# Patient Record
Sex: FEMALE | Race: WHITE | NOT HISPANIC OR LATINO | Employment: UNEMPLOYED | URBAN - METROPOLITAN AREA
[De-identification: names, ages, dates, MRNs, and addresses within clinical notes are randomized per-mention and may not be internally consistent; named-entity substitution may affect disease eponyms.]

---

## 2019-09-17 ENCOUNTER — HOSPITAL ENCOUNTER (OUTPATIENT)
Dept: URGENT CARE | Facility: CLINIC | Age: 39
Discharge: HOME | End: 2019-09-17
Attending: NURSE PRACTITIONER

## 2020-01-22 ENCOUNTER — HOSPITAL ENCOUNTER (OUTPATIENT)
Dept: URGENT CARE | Facility: CLINIC | Age: 40
Discharge: HOME | End: 2020-01-22
Attending: FAMILY MEDICINE

## 2020-05-12 ENCOUNTER — HOSPITAL ENCOUNTER (OUTPATIENT)
Dept: URGENT CARE | Facility: CLINIC | Age: 40
Discharge: HOME | End: 2020-05-12

## 2020-06-08 ENCOUNTER — OFFICE VISIT CONVERTED (OUTPATIENT)
Dept: NEUROLOGY | Facility: CLINIC | Age: 40
End: 2020-06-08
Attending: NURSE PRACTITIONER

## 2020-06-22 ENCOUNTER — HOSPITAL ENCOUNTER (OUTPATIENT)
Dept: CT IMAGING | Facility: HOSPITAL | Age: 40
Discharge: HOME | End: 2020-06-22
Attending: NURSE PRACTITIONER

## 2020-07-08 ENCOUNTER — OFFICE VISIT CONVERTED (OUTPATIENT)
Dept: NEUROLOGY | Facility: CLINIC | Age: 40
End: 2020-07-08
Attending: NURSE PRACTITIONER

## 2020-09-09 ENCOUNTER — OFFICE VISIT CONVERTED (OUTPATIENT)
Dept: NEUROLOGY | Facility: CLINIC | Age: 40
End: 2020-09-09
Attending: NURSE PRACTITIONER

## 2020-09-28 ENCOUNTER — HOSPITAL ENCOUNTER (OUTPATIENT)
Dept: URGENT CARE | Facility: CLINIC | Age: 40
Discharge: HOME | End: 2020-09-28
Attending: NURSE PRACTITIONER

## 2021-05-10 NOTE — H&P
History and Physical      Patient Name: Dee Bar   Patient ID: [id number]   Sex: Female   YOB: 1980    Referring Provider: Jannie MUSA    Visit Date: June 8, 2020    Provider: DIMPLE Raya   Location: [facility]   Location Address: [address] [place] [address]  [id number]   Location Phone: [phone]          Chief Complaint  · Migraines      History Of Present Illness  Dee Bar is a 39 year old female who presents today to [facility] today referred from Jannie MUSA for evaluation of headaches.   She reports that she developed headaches many years ago. Since that time, her headaches have progressively worsened.   Currently, she reports headaches that are located temporal and right sided. She characterizes the headaches as 10/10 in severity, sharp, throbbing, and in nature with associated photophobia, phonophobia, and nausea. Her headaches last 8-10 hours. She reports 16 headache days per month. She denies associated aura. She denies focal numbness, weakness, speech, and vision changes.   Triggers: Denies any known triggers   Symptoms improved by: Dark quiet room and Sleep   She states she is sleeping poorly. Reports getting 5-6 hours of sleep per night. Denies snoring. Reports unrefreshing sleep.   Prior prophylactic medications include: none   She uses abortive therapy such as: Tylenol, Toradol   Caffeine Use: 3 servings   Previous brain imaging: CT Head in ER-- 1. Subtle low-density focus in the right internal capsule, nonspecific in appearance. Chronic ischemic change would be in the differential. 2. No acute finding   She endorses a family history of cerebral aneurysm. Father passed away from brain aneurysm 4 years ago. Sister had 2 aneurysms found on scan after that time that were clipped.       Past Medical History  Hyperlipidemia; Migraines         Past Surgical History  C section; Cholecystectomy;  Hysterectomy; Partial Hysterectomy       Allergies Reconciled  Family Medical History  Depression; Stroke; Heart Disease; Hypertension; Diabetes; Family history of stroke         Social History  Alcohol (Current some day); lives alone; Single; Tobacco (Current every day); Working         Review of Systems  · Constitutional  o Admits  o : excessive sweating, fatigue  o Denies  o : chills, fever, sycope/passing out, weight gain, weight loss  · Eyes  o Admits  o : changes in vision, blurry vision, double vision  · HENT  o Admits  o : headaches  o Denies  o : loss of hearing, ringing in the ears, ear aches, sore throat, nasal congestion, sinus pain, nose bleeds, seasonal allergies  · Cardiovascular  o Denies  o : blood clots, swollen legs, anemia, easy burising or bleeding, transfusions  · Respiratory  o Denies  o : shortness of breath, dry cough, productive cough, pneumonia, COPD  · Gastrointestinal  o Denies  o : difficulty swallowing, reflux  · Genitourinary  o Denies  o : incontinence  · Neurologic  o Admits  o : headache, dizziness/vertigo, difficulty with sleep  o Denies  o : seizure, stroke, tremor, loss of balance, falls, numbness/tingling/paresthesia , difficulty with coordination, difficulty with dexterity, weakness  · Musculoskeletal  o Denies  o : neck stiffness/pain, swollen lymph nodes, muscle aches, joint pain, weakness, spasms, sciatica, pain radiating in arm, pain radiating in leg, low back pain  · Endocrine  o Denies  o : diabetes, thyroid disorder  · Psychiatric  o Admits  o : anxiety  o Denies  o : depression      Vitals  Date Time BP Position Site L\R Cuff Size HR RR TEMP (F) WT  HT  BMI kg/m2 BSA m2 O2 Sat        06/08/2020 07:50 /89 Sitting    79 - R  98.2 142lbs 8oz 5'   27.83 1.65           Physical Examination  · Constitutional  o Appearance  o : well-nourished, well groomed, in no apparent distress  · Eyes  o Pupils and Irises  o : Pupils equal, round, and reactive to light and  accommodation bilaterally  · Respiratory  o Auscultation of Lungs  o : Lungs were clear to ascultation bilaterally. No wheezes, rhonchi or rales were appreciated.  · Cardiovascular  o Heart  o :   § Auscultation of Heart  § : Regular rate and rhythm, no murmurs, gallops or rubs were appreciated.  o Peripheral Vascular System  o :   § Extremities  § : No peripheral edema was appreciated  · Musculoskeletal  o General  o : Normal bulk and normal tone throughout. 5/5 motor strength throughout and symmetric. Normal gait and station.  · Neurologic  o Mental Status Examination  o :   § Orientation  § : Alert and oriented to person, place, and time,  § Speech/Language  § : Intact naming, comprehension, and repetition. No dysarthria.  § Memory  § : Immediate recall is 3/3. Recall at 5 minutes is 3/3.   § Attention  § : Attention span is intact to serial 7 examination and finger tapping.   § Fund of Knowledge  § : Adequate fund of knowledge  o Cranial Nerves  o : Pupils are equal, round and reactive to light. Extraocular movements are intact. Visual fields are full. Fundoscopic examination reveals sharp disc bilaterally. Sensation in the V1-V3 distribution is intact and symmetric. Muscles of mastication are strong and symmetric. Muscles of facial expression are strong and symmetric. Hearing is intact. Palatal raise is intact and symmetric. Uvula is midline. Shoulder shrug is strong. Tongue protrudes in the midline.  o Motor Examination  o :   § RUE Strength  § : strength normal  § LUE Strength  § : strength normal  § RLE Strength  § : strength normal  § LLE Strength  § : strength normal  o Reflexes  o : 2+ reflexes throughout and symmetric. Negative Ortega. Negative Babinski.   o Sensation  o : Intact sensation to light touch, pinprick, vibration and proprioception throughout.  o Gait and Station  o :   § Gait Screening  § : Normal, narrow based gait, with a normal arm swing.  o Coordination  o : Intact finger to nose and  heel to shin. Rapid alternating movements are intact in the upper and lower extremities.          Assessment  · Intractable migraine without aura and without status migrainosus     346.11/G43.019  Will start amitriptyline for preventative therapy of migraine. Will consider triptan for abortive therapy after MRI and CTA obtained.  · Family history of aneurysm of blood vessel of brain     V17.49/Z82.49  Will order CTA for further evaluation due to first degree family history of brain aneurysm rupture.   · Abnormal CT scan, head     793.0/R93.0  Will order MRI brain to further evaluate abnormal findings in the right internal capsule found on CT head.     Problems Reconciled  Plan  · Orders  o CTA Head with IV Contrast [facility]; no Oral Prep (39655) - - 06/08/2020  o MRI brain wo then w contrast (03628) - 793.0/R93.0, 346.11/G43.019 - 06/08/2020  · Medications  o amitriptyline 25 mg oral tablet   SIG: take 1 tablet (25 mg) by oral route once daily at bedtime for 30 days   DISP: (30) tablets with 3 refills  Prescribed on 06/08/2020     o Medications have been Reconciled  o Transition of Care or Provider Policy  · Instructions  o Encouraged to follow-up with Primary Care Provider for preventative care.  o Call or Return if symptoms worsen or persist.  o Follow up in 1 month.             Electronically Signed by: DIMPLE Raya -Author on June 8, 2020 08:49:39 AM

## 2021-05-13 NOTE — PROGRESS NOTES
Progress Note      Patient Name: Dee Bar   Patient ID: [id number]   Sex: Female   YOB: 1980    Referring Provider: Jannie MUSA    Visit Date: September 9, 2020    Provider: DIMPLE Raya   Location: [facility]   Location Address: [address] [place] [address]  [id number]   Location Phone: [phone]          Chief Complaint     follow up       History Of Present Illness  Dee Bar is a 39 year old female who presents today to [facility] today referred from Jannie MUSA for evaluation of headaches.   She reports that she developed headaches many years ago. Since that time, her headaches have progressively worsened.   Currently, she reports headaches that are located temporal and right sided. She characterizes the headaches as 10/10 in severity, sharp, throbbing, and in nature with associated photophobia, phonophobia, and nausea. Her headaches last 8-10 hours. She reports 16 headache days per month. She denies associated aura. She denies focal numbness, weakness, speech, and vision changes.   Triggers: Denies any known triggers   Symptoms improved by: Dark quiet room and Sleep   She states she is sleeping poorly. Reports getting 5-6 hours of sleep per night. Denies snoring. Reports unrefreshing sleep.   Prior prophylactic medications include: amitriptyline, topiramate, unable to take BB due to hypotension.   She uses abortive therapy such as: Tylenol, Excedrin, Toradol, not triptan candidate due to chronic infarct right internal capsule, Naproxen, Ibuprofen   Caffeine Use: 3 servings   Previous brain imaging: CT Head in ER-- 1. Subtle low-density focus in the right internal capsule, nonspecific in appearance. Chronic ischemic change would be in the differential.2. No acute finding   She endorses a family history of cerebral aneurysm. Father passed away from brain aneurysm 4 years ago. Sister had 2  aneurysms found on scan after that time that were clipped.   Dee Bar is a 39 year old female who presents today to [facility] today referred from Jannie MUSA for follow up. Continuing to have 16 headache days per month. Failed topiramate due to side effect.   Previous brain imaging: MRI brain, CTA head      Childbearing potential: hysterectomy       Past Medical History  Hyperlipidemia; Migraines         Past Surgical History  C section; Cholecystectomy; Hysterectomy; Partial Hysterectomy         Medication List  Aimovig Autoinjector 140 mg/mL subcutaneous auto-injector; amitriptyline 25 mg oral tablet; buspirone 10 mg oral tablet; nurtec; Nurtec ODT 75 mg oral tablet,disintegrating; Prozac 20 mg oral capsule; trazodone 50 mg oral tablet; Tylenol 325 mg oral capsule         Allergy List  NO KNOWN DRUG ALLERGIES       Allergies Reconciled  Family Medical History  Depression; Stroke; Heart Disease; Hypertension; Diabetes; Family history of stroke         Social History  Alcohol (Current some day); lives alone; Single; Tobacco (Current every day); Working         Review of Systems  · Constitutional  o Admits  o : excessive sweating, fatigue  o Denies  o : chills, fever, sycope/passing out, weight gain, weight loss  · Eyes  o Denies  o : changes in vision, blurry vision, double vision  · HENT  o Denies  o : loss of hearing, ringing in the ears, ear aches, sore throat, nasal congestion, sinus pain, nose bleeds, seasonal allergies  · Cardiovascular  o Denies  o : blood clots, swollen legs, anemia, easy burising or bleeding, transfusions  · Respiratory  o Denies  o : shortness of breath, dry cough, productive cough, pneumonia, COPD  · Gastrointestinal  o Denies  o : difficulty swallowing, reflux  · Genitourinary  o Denies  o : incontinence  · Neurologic  o Admits  o : headache, stroke, difficulty with sleep  o Denies  o : seizure, tremor, loss of balance, falls, dizziness/vertigo,  numbness/tingling/paresthesia , difficulty with coordination, difficulty with dexterity, weakness  · Musculoskeletal  o Admits  o : low back pain  o Denies  o : neck stiffness/pain, swollen lymph nodes, muscle aches, joint pain, weakness, spasms, sciatica, pain radiating in arm, pain radiating in leg  · Endocrine  o Denies  o : diabetes, thyroid disorder  · Psychiatric  o Admits  o : anxiety, depression      Vitals  Date Time BP Position Site L\R Cuff Size HR RR TEMP (F) WT  HT  BMI kg/m2 BSA m2 O2 Sat HC       09/09/2020 09:46 /74 Sitting    79 - R  97.5 142lbs 7oz 5'   27.82 1.65           Physical Examination  · Constitutional  o Appearance  o : well-nourished, well groomed, in no apparent distress  · Eyes  o Pupils and Irises  o : Pupils equal, round, and reactive to light and accommodation bilaterally  · Respiratory  o Auscultation of Lungs  o : Lungs were clear to ascultation bilaterally. No wheezes, rhonchi or rales were appreciated.  · Cardiovascular  o Heart  o :   § Auscultation of Heart  § : Regular rate and rhythm, no murmurs, gallops or rubs were appreciated.  o Peripheral Vascular System  o :   § Extremities  § : No peripheral edema was appreciated  · Musculoskeletal  o General  o : Normal bulk and normal tone throughout. 5/5 motor strength throughout and symmetric. Normal gait and station.  · Neurologic  o Mental Status Examination  o :   § Orientation  § : Alert and oriented to person, place, and time,  § Speech/Language  § : Intact naming, comprehension, and repetition. No dysarthria.  § Memory  § : Immediate recall is 3/3. Recall at 5 minutes is 3/3.   § Attention  § : Attention span is intact to serial 7 examination and finger tapping.   § Fund of Knowledge  § : Adequate fund of knowledge  o Cranial Nerves  o : Pupils are equal, round and reactive to light. Extraocular movements are intact. Visual fields are full. Fundoscopic examination reveals sharp disc bilaterally. Sensation in the V1-V3  distribution is intact and symmetric. Muscles of mastication are strong and symmetric. Muscles of facial expression are strong and symmetric. Hearing is intact. Palatal raise is intact and symmetric. Uvula is midline. Shoulder shrug is strong. Tongue protrudes in the midline.  o Motor Examination  o :   § RUE Strength  § : strength normal  § LUE Strength  § : strength normal  § RLE Strength  § : strength normal  § LLE Strength  § : strength normal  o Reflexes  o : 2+ reflexes throughout and symmetric. Negative Ortega. Negative Babinski.   o Sensation  o : Intact sensation to light touch, pinprick, vibration and proprioception throughout.  o Gait and Station  o :   § Gait Screening  § : Normal, narrow based gait, with a normal arm swing.  o Coordination  o : Intact finger to nose and heel to shin. Rapid alternating movements are intact in the upper and lower extremities.          Assessment  · Intractable migraine without aura and without status migrainosus     346.11/G43.019  Will start Aimovig for preventative therapy of migraine, Is not triptan candidate due to small lacunar infarct in internal capsule on CTA. Continue Nurtec PRN for abortive therapy.   · Family history of aneurysm of blood vessel of brain     V17.49/Z82.49  Discussed CTA results. CTA negative for aneurysm.     Problems Reconciled  Plan  · Orders  o IM/SQ - Injection Fee [facility] (04626) - 346.11/G43.019 - 09/09/2020   Sample of 140mg Aimovig given SQ to right abdomen by HANS Arauz RN. Patient tolerated well. Lot [id number] Exp: 06/21  · Medications  o Aimovig Autoinjector 140 mg/mL subcutaneous auto-injector   SIG: inject 1 milliliter (140 mg) by subcutaneous route once a month in the abdomen, thigh, or outer area of upper arm for 30 days   DISP: (1) Auto-injector with 3 refills  Adjusted on 09/11/2020     o Nurtec ODT 75 mg oral tablet,disintegrating   SIG: Dissolve 1 tablet on top of tongue then swallow. Max 1 tablet/day   DISP: (8) tablets with 3  refills  Refilled on 09/11/2020     o Medications have been Reconciled  o Transition of Care or Provider Policy            Electronically Signed by: DIMPLE Raya -Author on September 11, 2020 01:30:21 PM

## 2021-05-13 NOTE — PROGRESS NOTES
Progress Note      Patient Name: Dee Bar   Patient ID: [id number]   Sex: Female   YOB: 1980    Referring Provider: Jannie MUSA    Visit Date: July 8, 2020    Provider: DIMPLE Raya   Location: [facility]   Location Address: [address] [place] [address]  [id number]   Location Phone: [phone]          Chief Complaint  · Migraines      History Of Present Illness  Dee Bar is a 39 year old female who presents today to [facility] today referred from Jannie MUSA for evaluation of headaches.   She reports that she developed headaches many years ago. Since that time, her headaches have progressively worsened.   Currently, she reports headaches that are located temporal and right sided. She characterizes the headaches as 10/10 in severity, sharp, throbbing, and in nature with associated photophobia, phonophobia, and nausea. Her headaches last 8-10 hours. She reports 16 headache days per month. She denies associated aura. She denies focal numbness, weakness, speech, and vision changes.   Triggers: Denies any known triggers   Symptoms improved by: Dark quiet room and Sleep   She states she is sleeping poorly. Reports getting 5-6 hours of sleep per night. Denies snoring. Reports unrefreshing sleep.   Prior prophylactic medications include: amitriptyline,   She uses abortive therapy such as: Tylenol, Excedrin, Toradol, not triptan candidate due to chronic infarct right internal capsule, Naproxen, Ibuprofen   Caffeine Use: 3 servings   Previous brain imaging: CT Head in ER-- 1. Subtle low-density focus in the right internal capsule, nonspecific in appearance. Chronic ischemic change would be in the differential.2. No acute finding   She endorses a family history of cerebral aneurysm. Father passed away from brain aneurysm 4 years ago. Sister had 2 aneurysms found on scan after that time that were clipped.   Dee CARRANZA  Yosvany is a 39 year old female who presents today to [facility] today referred from Jannie MUSA for follow up. Continuing to have 16 headache days per month. Did not tolerate amitriptyline due to side effect. States she's been under a lot of stress.   Previous brain imaging: MRI brain, CTA head       Past Medical History  Hyperlipidemia; Migraines         Past Surgical History  C section; Cholecystectomy; Hysterectomy; Partial Hysterectomy         Medication List  amitriptyline 25 mg oral tablet; buspirone 10 mg oral tablet; nurtec; Tylenol 325 mg oral capsule         Allergy List  NO KNOWN DRUG ALLERGIES       Allergies Reconciled  Family Medical History  Depression; Stroke; Heart Disease; Hypertension; Diabetes; Family history of stroke         Social History  Alcohol (Current some day); lives alone; Single; Tobacco (Current every day); Working         Review of Systems  · Constitutional  o Admits  o : fatigue, weight loss  o Denies  o : chills, excessive sweating, fever, sycope/passing out, weight gain  · Eyes  o Denies  o : changes in vision, blurry vision, double vision  · HENT  o Denies  o : loss of hearing, ringing in the ears, ear aches, sore throat, nasal congestion, sinus pain, nose bleeds, seasonal allergies  · Cardiovascular  o Denies  o : blood clots, swollen legs, anemia, easy burising or bleeding, transfusions  · Respiratory  o Denies  o : shortness of breath, dry cough, productive cough, pneumonia, COPD  · Gastrointestinal  o Denies  o : difficulty swallowing, reflux  · Genitourinary  o Denies  o : incontinence  · Neurologic  o Admits  o : headache, difficulty with sleep  o Denies  o : seizure, stroke, tremor, loss of balance, falls, dizziness/vertigo, numbness/tingling/paresthesia , difficulty with coordination, difficulty with dexterity, weakness  · Musculoskeletal  o Admits  o : low back pain  o Denies  o : neck stiffness/pain, swollen lymph nodes, muscle aches, joint pain,  weakness, spasms, sciatica, pain radiating in arm, pain radiating in leg  · Endocrine  o Denies  o : diabetes, thyroid disorder  · Psychiatric  o Admits  o : anxiety  o Denies  o : depression      Vitals  Date Time BP Position Site L\R Cuff Size HR RR TEMP (F) WT  HT  BMI kg/m2 BSA m2 O2 Sat        07/08/2020 08:41 /75 Sitting    83 - R  97.8 143lbs 6oz              Physical Examination  · Constitutional  o Appearance  o : well-nourished, well groomed, in no apparent distress  · Eyes  o Pupils and Irises  o : Pupils equal, round, and reactive to light and accommodation bilaterally  · Respiratory  o Auscultation of Lungs  o : Lungs were clear to ascultation bilaterally. No wheezes, rhonchi or rales were appreciated.  · Cardiovascular  o Heart  o :   § Auscultation of Heart  § : Regular rate and rhythm, no murmurs, gallops or rubs were appreciated.  o Peripheral Vascular System  o :   § Extremities  § : No peripheral edema was appreciated  · Musculoskeletal  o General  o : Normal bulk and normal tone throughout. 5/5 motor strength throughout and symmetric. Normal gait and station.  · Neurologic  o Mental Status Examination  o :   § Orientation  § : Alert and oriented to person, place, and time,  § Speech/Language  § : Intact naming, comprehension, and repetition. No dysarthria.  § Memory  § : Immediate recall is 3/3. Recall at 5 minutes is 3/3.   § Attention  § : Attention span is intact to serial 7 examination and finger tapping.   § Fund of Knowledge  § : Adequate fund of knowledge  o Cranial Nerves  o : Pupils are equal, round and reactive to light. Extraocular movements are intact. Visual fields are full. Fundoscopic examination reveals sharp disc bilaterally. Sensation in the V1-V3 distribution is intact and symmetric. Muscles of mastication are strong and symmetric. Muscles of facial expression are strong and symmetric. Hearing is intact. Palatal raise is intact and symmetric. Uvula is midline. Shoulder  shrug is strong. Tongue protrudes in the midline.  o Motor Examination  o :   § RUE Strength  § : strength normal  § LUE Strength  § : strength normal  § RLE Strength  § : strength normal  § LLE Strength  § : strength normal  o Reflexes  o : 2+ reflexes throughout and symmetric. Negative Ortega. Negative Babinski.   o Sensation  o : Intact sensation to light touch, pinprick, vibration and proprioception throughout.  o Gait and Station  o :   § Gait Screening  § : Normal, narrow based gait, with a normal arm swing.  o Coordination  o : Intact finger to nose and heel to shin. Rapid alternating movements are intact in the upper and lower extremities.          Assessment  · Intractable migraine without aura and without status migrainosus     346.11/G43.019  Discussed MRI results. Read normal. Will start topiramate for preventative therapy of migraine. Nurtec PRN for abortive therapy. Is not triptan candidate due to small lacunar infarct in internal capsule on CTA.   · Family history of aneurysm of blood vessel of brain     V17.49/Z82.49  Discussed CTA results. CTA negative for aneurysm.     Problems Reconciled  Plan  · Orders  o ACO-17: Screened for tobacco use AND received tobacco cessation intervention (4004F) - - 07/08/2020  · Medications  o Nurtec ODT 75 mg oral tablet,disintegrating   SIG: Dissolve 1 tablet on top of tongue then swallow. Max 1 tablet/day   DISP: (8) tablets with 3 refills  Prescribed on 07/08/2020     o topiramate 50 mg oral tablet   SIG: take 1 tablet by oral route 2 times a day for 30 days one tablet qHS for 2 weeks, then BID   DISP: (60) tablets with 5 refills  Prescribed on 07/08/2020     o Medications have been Reconciled  o Transition of Care or Provider Policy  · Instructions  o Call or Return if symptoms persist.  o Handouts provided regarding new medications listed above.  o Follow Up in 2 months.   o Electronically Identified Patient Education Materials Provided  Electronically  · Disposition  o Call or Return if symptoms worsen or persist.            Electronically Signed by: DIMPLE Raya -Author on July 8, 2020 01:24:06 PM

## 2021-05-14 VITALS
BODY MASS INDEX: 27.96 KG/M2 | DIASTOLIC BLOOD PRESSURE: 74 MMHG | TEMPERATURE: 97.5 F | HEART RATE: 79 BPM | SYSTOLIC BLOOD PRESSURE: 115 MMHG | WEIGHT: 142.44 LBS | HEIGHT: 60 IN

## 2021-05-15 VITALS
WEIGHT: 142.5 LBS | SYSTOLIC BLOOD PRESSURE: 126 MMHG | HEART RATE: 79 BPM | DIASTOLIC BLOOD PRESSURE: 89 MMHG | HEIGHT: 60 IN | TEMPERATURE: 98.2 F | BODY MASS INDEX: 27.98 KG/M2

## 2021-05-15 VITALS
HEART RATE: 83 BPM | WEIGHT: 143.37 LBS | SYSTOLIC BLOOD PRESSURE: 148 MMHG | TEMPERATURE: 97.8 F | DIASTOLIC BLOOD PRESSURE: 75 MMHG

## 2021-08-11 ENCOUNTER — HOSPITAL ENCOUNTER (EMERGENCY)
Facility: HOSPITAL | Age: 41
Discharge: HOME | End: 2021-08-11
Attending: EMERGENCY MEDICINE | Admitting: EMERGENCY MEDICINE

## 2021-08-11 ENCOUNTER — APPOINTMENT (OUTPATIENT)
Dept: GENERAL RADIOLOGY | Facility: HOSPITAL | Age: 41
End: 2021-08-11

## 2021-08-11 VITALS
HEIGHT: 62 IN | OXYGEN SATURATION: 100 % | DIASTOLIC BLOOD PRESSURE: 84 MMHG | HEART RATE: 98 BPM | SYSTOLIC BLOOD PRESSURE: 107 MMHG | RESPIRATION RATE: 18 BRPM | WEIGHT: 135.36 LBS | TEMPERATURE: 99 F | BODY MASS INDEX: 24.91 KG/M2

## 2021-08-11 DIAGNOSIS — M25.512 ACUTE PAIN OF LEFT SHOULDER: ICD-10-CM

## 2021-08-11 DIAGNOSIS — M25.552 LEFT HIP PAIN: ICD-10-CM

## 2021-08-11 DIAGNOSIS — M16.12 ARTHRITIS OF LEFT HIP: Primary | ICD-10-CM

## 2021-08-11 PROCEDURE — 96372 THER/PROPH/DIAG INJ SC/IM: CPT

## 2021-08-11 PROCEDURE — 73502 X-RAY EXAM HIP UNI 2-3 VIEWS: CPT

## 2021-08-11 PROCEDURE — 73030 X-RAY EXAM OF SHOULDER: CPT

## 2021-08-11 PROCEDURE — 25010000002 KETOROLAC TROMETHAMINE PER 15 MG

## 2021-08-11 PROCEDURE — 25010000002 DEXAMETHASONE PER 1 MG

## 2021-08-11 PROCEDURE — 99282 EMERGENCY DEPT VISIT SF MDM: CPT

## 2021-08-11 RX ORDER — KETOROLAC TROMETHAMINE 30 MG/ML
30 INJECTION, SOLUTION INTRAMUSCULAR; INTRAVENOUS ONCE
Status: COMPLETED | OUTPATIENT
Start: 2021-08-11 | End: 2021-08-11

## 2021-08-11 RX ORDER — DEXAMETHASONE SODIUM PHOSPHATE 10 MG/ML
6 INJECTION INTRAMUSCULAR; INTRAVENOUS ONCE
Status: COMPLETED | OUTPATIENT
Start: 2021-08-11 | End: 2021-08-11

## 2021-08-11 RX ORDER — KETOROLAC TROMETHAMINE 10 MG/1
10 TABLET, FILM COATED ORAL EVERY 6 HOURS PRN
Qty: 20 TABLET | Refills: 0 | OUTPATIENT
Start: 2021-08-11 | End: 2022-01-04

## 2021-08-11 RX ADMIN — KETOROLAC TROMETHAMINE 30 MG: 30 INJECTION, SOLUTION INTRAMUSCULAR; INTRAVENOUS at 22:41

## 2021-08-11 RX ADMIN — DEXAMETHASONE SODIUM PHOSPHATE 6 MG: 10 INJECTION INTRAMUSCULAR; INTRAVENOUS at 22:40

## 2021-08-12 NOTE — ED PROVIDER NOTES
Subjective    Patient is here today for left hip pain and left shoulder pain. Patient has known arthritis in lower back area and states she thinks she might have arthritis in her left hip.      History provided by:  Patient   used: No    Hip Pain  Location:  Left hip  Severity:  Moderate  Onset quality:  Gradual  Duration:  3 days  Timing:  Constant  Chronicity: Acute on chronic.  Associated symptoms: no abdominal pain, no chest pain, no congestion, no cough, no diarrhea, no ear pain, no fever, no headaches, no nausea, no shortness of breath, no sore throat and no vomiting        Review of Systems   Constitutional: Negative for chills and fever.   HENT: Negative for congestion, ear pain and sore throat.    Eyes: Negative for pain.   Respiratory: Negative for cough, chest tightness and shortness of breath.    Cardiovascular: Negative for chest pain.   Gastrointestinal: Negative for abdominal pain, diarrhea, nausea and vomiting.   Genitourinary: Negative for flank pain and hematuria.   Musculoskeletal: Positive for arthralgias. Negative for joint swelling.        Reports pain with ambulation to left hip as well as pain to left shoulder with movement.   Skin: Negative for pallor.   Neurological: Negative for seizures and headaches.   All other systems reviewed and are negative.      Past Medical History:   Diagnosis Date   • Arthritis    • Asthma        No Known Allergies    History reviewed. No pertinent surgical history.    History reviewed. No pertinent family history.    Social History     Socioeconomic History   • Marital status:      Spouse name: Not on file   • Number of children: Not on file   • Years of education: Not on file   • Highest education level: Not on file   Tobacco Use   • Smoking status: Current Every Day Smoker     Packs/day: 1.00     Types: Cigarettes   • Smokeless tobacco: Never Used   Vaping Use   • Vaping Use: Never used   Substance and Sexual Activity   • Alcohol  use: Yes     Comment: social   • Drug use: Defer   • Sexual activity: Defer           Objective   Physical Exam  Vitals and nursing note reviewed.   Constitutional:       General: She is not in acute distress.     Appearance: Normal appearance. She is not toxic-appearing.   HENT:      Head: Normocephalic and atraumatic.      Mouth/Throat:      Mouth: Mucous membranes are moist.   Eyes:      Extraocular Movements: Extraocular movements intact.      Pupils: Pupils are equal, round, and reactive to light.   Cardiovascular:      Rate and Rhythm: Normal rate and regular rhythm.      Pulses: Normal pulses.      Heart sounds: Normal heart sounds.   Pulmonary:      Effort: Pulmonary effort is normal. No respiratory distress.      Breath sounds: Normal breath sounds.   Abdominal:      General: Abdomen is flat.      Palpations: Abdomen is soft.      Tenderness: There is no abdominal tenderness.   Musculoskeletal:         General: Tenderness present. No deformity.      Cervical back: Normal range of motion and neck supple.      Comments: Patient has full range of motion. She is hesitant to perform full range of motion task due to pain however is able to do so.   Skin:     General: Skin is warm and dry.      Capillary Refill: Capillary refill takes 2 to 3 seconds.   Neurological:      General: No focal deficit present.      Mental Status: She is alert. Mental status is at baseline.         Procedures           ED Course                                           MDM  Number of Diagnoses or Management Options  Acute pain of left shoulder: minor  Arthritis of left hip: minor  Left hip pain: minor  Diagnosis management comments: I have spoke with the patient and I have explained the patient´s condition, diagnoses and treatment plan based on the information available to me at this time. I have answered all questions and addressed any concerns. The patient has a good understanding of the patient´s diagnosis, condition, and treatment  plan as can be expected at this point. The vital signs have been stable. The patient´s condition is stable and appropriate for discharge from the emergency department.      The patient will pursue further outpatient evaluation with the primary care physician or other designated or consulting physician as outlined in the discharge instructions. They are agreeable to this plan of care and follow-up instructions have been explained in detail. The patient has received these instructions in written format and have expressed an understanding of the discharge instructions. The patient is aware that any significant change in condition or worsening of symptoms should prompt an immediate return to this or the closest emergency department or call to 911.       Amount and/or Complexity of Data Reviewed  Tests in the radiology section of CPT®: reviewed and ordered    Risk of Complications, Morbidity, and/or Mortality  Diagnostic procedures: low  Management options: low    Patient Progress  Patient progress: stable      Final diagnoses:   Left hip pain   Acute pain of left shoulder   Arthritis of left hip       ED Disposition  ED Disposition     ED Disposition Condition Comment    Discharge Stable           Jannie Maldonado, APRN  [address] [id number] [phone]    In 3 days  Follow-up with your PCP for MRI follow-up for this ER visit.         Medication List      New Prescriptions    ketorolac 10 MG tablet  Commonly known as: TORADOL  Take 1 tablet by mouth Every 6 (Six) Hours As Needed for Moderate Pain .           Where to Get Your Medications      These medications were sent to [id number] - [place], [place] - [address] AT [facility] - [phone] [facility] [phone] FX  [address] [place] [address] [place] [address]    Hours: 24-hours Phone: [phone]   · ketorolac 10 MG tablet          Ai Martinez, APRN  08/12/21 2630

## 2022-03-09 ENCOUNTER — OFFICE VISIT (OUTPATIENT)
Dept: ORTHOPEDIC SURGERY | Facility: CLINIC | Age: 42
End: 2022-03-09

## 2022-03-09 VITALS — OXYGEN SATURATION: 98 % | WEIGHT: 137 LBS | BODY MASS INDEX: 26.9 KG/M2 | HEIGHT: 60 IN | HEART RATE: 71 BPM

## 2022-03-09 DIAGNOSIS — M25.552 LEFT HIP PAIN: Primary | ICD-10-CM

## 2022-03-09 PROCEDURE — 99203 OFFICE O/P NEW LOW 30 MIN: CPT | Performed by: ORTHOPAEDIC SURGERY

## 2022-03-09 RX ORDER — CHOLECALCIFEROL (VITAMIN D3) 125 MCG
CAPSULE ORAL
COMMUNITY
Start: 2022-03-04

## 2022-03-09 RX ORDER — DICLOFENAC SODIUM 75 MG/1
TABLET, DELAYED RELEASE ORAL
COMMUNITY
Start: 2022-02-25 | End: 2022-04-20 | Stop reason: SDUPTHER

## 2022-03-09 NOTE — PROGRESS NOTES
"Chief Complaint  Initial Evaluation of the Left Hip     Subjective      Dee Bar presents to [facility] for an evaluation of left hip. She has been having left hip pain for approximately 1 year. In the last 3-4 months the pain has worsened. She states pain on the lateral aspect of the hip. She denies groin pain.     No Known Allergies     Social History     Socioeconomic History   • Marital status:    Tobacco Use   • Smoking status: Current Every Day Smoker     Packs/day: 1.00     Types: Cigarettes   • Smokeless tobacco: Never Used   Vaping Use   • Vaping Use: Never used   Substance and Sexual Activity   • Alcohol use: Never     Comment: social   • Drug use: Defer   • Sexual activity: Defer        Review of Systems     Objective   Vital Signs:   Pulse 71   Ht 152.4 cm (60\")   Wt 62.1 kg (137 lb)   SpO2 98%   BMI 26.76 kg/m²       Physical Exam  Constitutional:       Appearance: Normal appearance. Patient is well-developed and normal weight.   HENT:      Head: Normocephalic.      Right Ear: Hearing and external ear normal.      Left Ear: Hearing and external ear normal.      Nose: Nose normal.   Eyes:      Conjunctiva/sclera: Conjunctivae normal.   Cardiovascular:      Rate and Rhythm: Normal rate.   Pulmonary:      Effort: Pulmonary effort is normal.      Breath sounds: No wheezing or rales.   Abdominal:      Palpations: Abdomen is soft.      Tenderness: There is no abdominal tenderness.   Musculoskeletal:      Cervical back: Normal range of motion.   Skin:     Findings: No rash.   Neurological:      Mental Status: Patient  is alert and oriented to person, place, and time.   Psychiatric:         Mood and Affect: Mood and affect normal.         Judgment: Judgment normal.       Ortho Exam      LEFT HIP: Good tone of hip flexors, hip extensors, hip adductor, hip abductors. Sensation grossly intact. Neurovascular intact.  Calf supple, non-tender, no signs of DVT. " Dorsal Pedal Pulse 2+, posterior tibialis pulse 2+. Lateral sided hip pain with internal rotation. Flexion to 130 degrees.       Procedures        Imaging Results (Most Recent)     None           Result Review :     PROCEDURE:  XR HIP W OR WO PELVIS 2-3 VIEW LEFT     COMPARISON: [facility], , LEFT HIP/PELVIS, 12/10/2018, 17:23.     INDICATIONS:  LEFT HIP PAIN NO KNOWN INJURY     FINDINGS:          There is a calcifications seen at the lateral-superior left acetabulum, as before.  Mineralization   appears within normal limits.  No definite displaced fracture or acute malalignment is seen.  Joint   spaces appear preserved.  Soft tissues appear grossly unremarkable.     CONCLUSION:   1. No definite radiographic findings of acute osseous hip or pelvic abnormality.  2. Chronic calcification at the superior-lateral left acetabulum which could be an accessory   ossicle or related to chronic labral tear.          Assessment and Plan     DX: Left hip pain     Discussed treatment plans and diagnosis with the patient. Discussed MRI with the patient. She agrees with this plan.     Call or return if worsening symptoms.    Follow Up     Follow-up after MRI.       Patient was given instructions and counseling regarding her condition or for health maintenance advice. Please see specific information pulled into the AVS if appropriate.     Scribed for Thomas Doll MD by Vanessa Matamoros.  03/09/22   10:28 EST    I have personally performed the services described in this document as scribed by the above individual and it is both accurate and complete. Thomas Doll MD 03/09/22

## 2022-03-21 ENCOUNTER — TELEPHONE (OUTPATIENT)
Dept: ORTHOPEDIC SURGERY | Facility: CLINIC | Age: 42
End: 2022-03-21

## 2022-03-21 NOTE — TELEPHONE ENCOUNTER
Caller: EFREN     Relationship: PCP OFFICE    Best call back number: [phone]    What form or medical record are you requesting: PROGRESS NOTES FROM 3/9/22    Who is requesting this form or medical record from you: PCP    How would you like to receive the form or medical records (pick-up, mail, fax): FAX  If fax, what is the fax number:       Timeframe paperwork needed: ASAP

## 2022-03-31 ENCOUNTER — TRANSCRIBE ORDERS (OUTPATIENT)
Dept: ADMINISTRATIVE | Facility: HOSPITAL | Age: 42
End: 2022-03-31

## 2022-03-31 DIAGNOSIS — G43.109 MIGRAINE AURA WITHOUT HEADACHE: Primary | ICD-10-CM

## 2022-04-01 ENCOUNTER — HOSPITAL ENCOUNTER (OUTPATIENT)
Dept: MRI IMAGING | Facility: HOSPITAL | Age: 42
Discharge: HOME | End: 2022-04-01
Admitting: ORTHOPAEDIC SURGERY

## 2022-04-01 DIAGNOSIS — M25.552 LEFT HIP PAIN: ICD-10-CM

## 2022-04-01 PROCEDURE — 73721 MRI JNT OF LWR EXTRE W/O DYE: CPT

## 2022-04-04 RX ORDER — RIMEGEPANT SULFATE 75 MG/75MG
TABLET, ORALLY DISINTEGRATING ORAL
Qty: 8 TABLET | OUTPATIENT
Start: 2022-04-04

## 2022-04-20 ENCOUNTER — OFFICE VISIT (OUTPATIENT)
Dept: ORTHOPEDIC SURGERY | Facility: CLINIC | Age: 42
End: 2022-04-20

## 2022-04-20 VITALS — HEART RATE: 96 BPM | HEIGHT: 60 IN | OXYGEN SATURATION: 98 % | BODY MASS INDEX: 26.74 KG/M2 | WEIGHT: 136.2 LBS

## 2022-04-20 DIAGNOSIS — S73.192A TEAR OF LEFT ACETABULAR LABRUM, INITIAL ENCOUNTER: Primary | ICD-10-CM

## 2022-04-20 PROCEDURE — 99214 OFFICE O/P EST MOD 30 MIN: CPT | Performed by: PHYSICIAN ASSISTANT

## 2022-04-20 RX ORDER — METHYLPREDNISOLONE 4 MG/1
TABLET ORAL
Qty: 21 TABLET | Refills: 0 | Status: SHIPPED | OUTPATIENT
Start: 2022-04-20

## 2022-04-20 RX ORDER — TOPIRAMATE 50 MG/1
50 TABLET, FILM COATED ORAL 2 TIMES DAILY
COMMUNITY
Start: 2022-04-03

## 2022-04-20 RX ORDER — DICLOFENAC SODIUM 75 MG/1
75 TABLET, DELAYED RELEASE ORAL 2 TIMES DAILY
Qty: 60 TABLET | Refills: 1 | Status: SHIPPED | OUTPATIENT
Start: 2022-04-20

## 2022-04-20 NOTE — PROGRESS NOTES
"Chief Complaint  Pain and Follow-up of the Left Hip    Subjective          Dee Bar presents to [facility] for follow-up on left hip pain.  Patient here today to discuss MRI results. She has been having left hip pain for approximately 1 year. In the last 3-4 months the pain has worsened.  She describes pain in the groin.  She has also developed a popping and clicking sensation in the hip and reports similar symptoms in the right hip.  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  They are in an off season and she does note that the pain has lessened.  She takes diclofenac for pain.  Denies prior surgery to the hips.    Objective   Allergies   Allergen Reactions   • Amitriptyline GI Intolerance       Vital Signs:   Pulse 96   Ht 152.4 cm (60\")   Wt 61.8 kg (136 lb 3.2 oz)   SpO2 98%   BMI 26.60 kg/m²       Physical Exam  Constitutional:       Appearance: Normal appearance. Patient is well-developed and normal weight.   HENT:      Head: Normocephalic.      Right Ear: Hearing and external ear normal.      Left Ear: Hearing and external ear normal.      Nose: Nose normal.   Eyes:      Conjunctiva/sclera: Conjunctivae normal.   Cardiovascular:      Rate and Rhythm: Normal rate.   Pulmonary:      Effort: Pulmonary effort is normal.      Breath sounds: No wheezing or rales.   Abdominal:      Palpations: Abdomen is soft.      Tenderness: There is no abdominal tenderness.   Musculoskeletal:      Cervical back: Normal range of motion.   Skin:     Findings: No rash.   Neurological:      Mental Status: Patient is alert and oriented to person, place, and time.   Psychiatric:         Mood and Affect: Mood and affect normal.         Judgment: Judgment normal.     Ortho Exam  Left hip: Skin intact, no swelling, good flexion and abduction, pain with internal and external rotation, sensation intact " and posterior to pulses 2+, good range of motion knee ankle digits.  No swelling.  Tenderness in the groin.  Result Review :            Imaging Results (Most Recent)     None       MRI Hip Left Without Contrast    Result Date: 4/4/2022  Narrative: PROCEDURE: MRI HIP LEFT WO CONTRAST  COMPARISON:  [facility], CR, XR HIP W OR WO PELVIS 2-3 VIEW LEFT, 8/11/2021, 20:32. INDICATIONS: left hip pain      TECHNIQUE: A comprehensive examination was performed utilizing a variety of imaging planes and imaging parameters to optimize visualization of suspected pathology.  Images were performed without contrast.  FINDINGS:  No fracture or malalignment is identified.  Mild bilateral sacroiliac joint osteoarthritis is noted.  Associated marrow edema is present.  Marrow signal otherwise appears unremarkable.  Dedicated images of the left hip were obtained.  Multiple os acetabula are noted.  The superolateral labrum attaches to the os.  Intermediate signal in the posterosuperior labrum is suspected to represent calcification.  There is a tear/detachment of the anterosuperior labrum.  No significant joint effusion or loose body is seen.  Cartilage in the joint is intact.  Mild bilateral trochanteric bursitis is noted.  The visualized musculature and tendons around the pelvis appear unremarkable.  Bilateral ovarian follicles are noted.  A hysterectomy has been performed.      Impression:   1. Multiple os acetabula on the left. 2. The left hip lateral labrum attach is to an os.  Anterosuperiorly, the labrum is detached.  Posterosuperiorly, intermediate signal in the labrum is favored to represent calcification. 3. Mild bilateral trochanteric bursitis 4. 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       Electronically Signed and Approved By: Kenrick Guo M.D. on 4/04/2022 at 17:14                      Assessment and Plan    Problem List Items Addressed This Visit        Musculoskeletal  and Injuries    Tear of left acetabular labrum - Primary    Current Assessment & Plan     MRI results reviewed and discussed with patient today.  Treatment options were discussed to include surgical intervention, anti-inflammatories, home exercises, physical therapy, intra-articular injections and steroids.  Patient would like to continue diclofenac, refill provided today.  She will also take a Medrol Dosepak, she would like to avoid injections if possible.  Home exercises were printed, patient would like to hold off on physical therapy at this time.  Therapy order provided in case she changes her mind.  She is not interested in surgery.  Plan to follow-up in 6 weeks for recheck, if no improvement may consider physical therapy versus intra-articular hip injection.           Relevant Orders    Ambulatory Referral to Physical Therapy Evaluate and treat, POST OP; Stretching, ROM, Strengthening          Follow Up   Return in about 6 weeks (around 6/1/2022) for Recheck.  Patient Instructions   MRI results reviewed and discussed with patient today.  Treatment options were discussed to include surgical intervention, anti-inflammatories, home exercises, physical therapy, intra-articular injections and steroids.  Patient would like to continue diclofenac, refill provided today.  She will also take a Medrol Dosepak, she would like to avoid injections if possible.  Home exercises were printed, patient would like to hold off on physical therapy at this time.  Therapy order provided in case she changes her mind.  She is not interested in surgery.  Plan to follow-up in 6 weeks for recheck, if no improvement may consider physical therapy versus intra-articular hip injection.    Patient was given instructions and counseling regarding her condition or for health maintenance advice. Please see specific information pulled into the AVS if appropriate.

## 2022-04-20 NOTE — ASSESSMENT & PLAN NOTE
MRI results reviewed and discussed with patient today.  Treatment options were discussed to include surgical intervention, anti-inflammatories, home exercises, physical therapy, intra-articular injections and steroids.  Patient would like to continue diclofenac, refill provided today.  She will also take a Medrol Dosepak, she would like to avoid injections if possible.  Home exercises were printed, patient would like to hold off on physical therapy at this time.  Therapy order provided in case she changes her mind.  She is not interested in surgery.  Plan to follow-up in 6 weeks for recheck, if no improvement may consider physical therapy versus intra-articular hip injection.

## 2022-04-21 ENCOUNTER — HOSPITAL ENCOUNTER (OUTPATIENT)
Dept: MRI IMAGING | Facility: HOSPITAL | Age: 42
Discharge: HOME | End: 2022-04-21
Admitting: PSYCHIATRY & NEUROLOGY

## 2022-04-21 DIAGNOSIS — G43.109 MIGRAINE AURA WITHOUT HEADACHE: ICD-10-CM

## 2022-04-21 PROCEDURE — 0 GADOBENATE DIMEGLUMINE 529 MG/ML SOLUTION: Performed by: PSYCHIATRY & NEUROLOGY

## 2022-04-21 PROCEDURE — A9577 INJ MULTIHANCE: HCPCS | Performed by: PSYCHIATRY & NEUROLOGY

## 2022-04-21 PROCEDURE — 70553 MRI BRAIN STEM W/O & W/DYE: CPT

## 2022-04-21 RX ADMIN — GADOBENATE DIMEGLUMINE 10 ML: 529 INJECTION, SOLUTION INTRAVENOUS at 19:48

## 2022-05-11 ENCOUNTER — TELEPHONE (OUTPATIENT)
Dept: ORTHOPEDIC SURGERY | Facility: CLINIC | Age: 42
End: 2022-05-11

## 2022-05-31 RX ORDER — TOPIRAMATE 50 MG/1
TABLET, FILM COATED ORAL
Qty: 60 TABLET | OUTPATIENT
Start: 2022-05-31

## 2023-01-16 RX ORDER — RIMEGEPANT SULFATE 75 MG/75MG
TABLET, ORALLY DISINTEGRATING ORAL
Qty: 8 TABLET | OUTPATIENT
Start: 2023-01-16